# Patient Record
Sex: FEMALE | Race: WHITE
[De-identification: names, ages, dates, MRNs, and addresses within clinical notes are randomized per-mention and may not be internally consistent; named-entity substitution may affect disease eponyms.]

---

## 2018-02-13 ENCOUNTER — HOSPITAL ENCOUNTER (EMERGENCY)
Dept: HOSPITAL 39 - ER | Age: 58
Discharge: HOME | End: 2018-02-13
Payer: SELF-PAY

## 2018-02-13 VITALS — SYSTOLIC BLOOD PRESSURE: 119 MMHG | OXYGEN SATURATION: 94 % | DIASTOLIC BLOOD PRESSURE: 78 MMHG | TEMPERATURE: 98.5 F

## 2018-02-13 DIAGNOSIS — F17.200: ICD-10-CM

## 2018-02-13 DIAGNOSIS — J04.0: ICD-10-CM

## 2018-02-13 DIAGNOSIS — N30.90: Primary | ICD-10-CM

## 2018-02-13 PROCEDURE — 81001 URINALYSIS AUTO W/SCOPE: CPT

## 2018-02-13 PROCEDURE — 87086 URINE CULTURE/COLONY COUNT: CPT

## 2018-02-13 NOTE — ED.PDOC
History of Present Illness





- General


Chief Complaint: ENT Problem


Stated Complaint: cough, sorethroat, burning with urination


Time Seen by Provider: 02/13/18 15:55


Source: patient


Exam Limitations: no limitations





- History of Present Illness


Initial Comments: 





The patient is a 57-year-old  female presenting to the emergency room 

secondary to symptoms of cough congestion and laryngitis for the past for 5 

days.  Additionally she has been having urinary tract symptoms for the last 

week or 2.  She does have a history of frequent urinary tract infections in the 

past.  She has had some low-grade fevers at home.  No new back pain.  No nausea 

or vomiting. No uncontrolled headaches.  No altered mental status.  No 

shortness of breath.  She has had a cough.


Timing/Duration: 1 week


Severity: mild


Improving Factors: nothing


Worsening Factors: nothing


Associated Symptoms: denies symptoms


Allergies/Adverse Reactions: 


Allergies





Codeine Allergy (Verified 02/13/18 15:17)


 


Penicillins Allergy (Verified 02/13/18 15:17)


 


Tetanus Toxoids Allergy (Verified 02/13/18 15:17)


 








Home Medications: 


Ambulatory Orders





Cephalexin Monohydrate [Keflex] 500 mg PO Q8H #21 cap 02/13/18 


Hydroxyzine HCl 25 mg PO PRN 02/13/18 


busPIRone HCL [Buspar] 15 mg PO BID 02/13/18 











Review of Systems





- Review of Systems


Constitutional: States: malaise


EENTM: States: nose congestion, throat pain


Respiratory: States: cough


Cardiology: States: no symptoms reported


Gastrointestinal/Abdominal: States: no symptoms reported


Genitourinary: States: dysuria, frequency, pain


Musculoskeletal: States: no symptoms reported


Skin: States: no symptoms reported


Neurological: States: no symptoms reported


Endocrine: States: no symptoms reported


All other Systems: No Change from Baseline





Past Medical History (General)





- Patient Medical History


Hx of COPD: No


Hx Cardiac Disorders: No


Hx Congestive Heart Failure: No


Hx Hypertension: No


Hx Diabetes: No


Surgical History: Hysterectomy





- Vaccination History


Hx Tetanus, Diphtheria Vaccination: No - Allergic


Hx Influenza Vaccination: No


Hx Pneumococcal Vaccination: No





- Social History


Hx Tobacco Use: No


Hx Alcohol Use: No





- Female History


Patient is a Female of Child Bearing Age (10 -59 yrs old): No


Patient Pregnant: No





Family Medical History





- Family History


  ** Mother


Family History: Unknown


Living Status: Unknown





Physical Exam





- Physical Exam


General Appearance: Alert, Comfortable, No apparent distress


Eye Exam: bilateral normal


Ears, Nose, Throat: hearing grossly normal, nasal congestion, pharyngeal 

erythema


Neck: full range of motion, supple


Respiratory: lungs clear, normal breath sounds, no respiratory distress, no 

accessory muscle use


Cardiovascular/Chest: normal peripheral pulses, regular rate, rhythm, no edema


Peripheral Pulses: radial,right: 2+, radial,left: 2+, dorsalis pedis,right: 2+, 

dorsalis pedis,left: 2+


Gastrointestinal/Abdominal: non tender, soft


Rectal Exam: deferred


Back Exam: normal inspection, no CVA tenderness, no vertebral tenderness


Extremity: normal range of motion, non-tender, normal inspection, no pedal edema

, normal capillary refill


Neurologic: CNs II-XII nml as tested, alert, normal mood/affect, oriented x 3


Skin Exam: normal color


Comments: 





 Vital Signs - 24 hr











  02/13/18





  15:00


 


Temperature 98.5 F


 


Pulse Rate [ 107 H





pulse ox] 


 


Respiratory 20





Rate 


 


Blood Pressure 119/78





[Left Arm] 


 


O2 Sat by Pulse 94 L





Oximetry 














Progress





- Progress


Progress: 





02/13/18 16:50


the patient is a 57-year-old  female presenting with cystitis and 

upper respiratory tract infection including laryngitis.  The patient did 

receive a dose of oral prednisone here for the laryngitis.  She is going to be 

placed on a broad-spectrum antibiotic of Keflex for approximately 7 days.  She 

needs a test of cure on her urine in about 10 days.  She needs to return to the 

emergency room for any significant worsening.  She needs to stop smoking 

obviously.  ER warnings were given.  no flu swab was done due to lack of 

availability.  Minimal pulmonary symptoms were present so no empiric Tamiflu 

treatment was given.





- Results/Orders


Results/Orders: 





 





02/13/18 16:05


URINE CULTURE W/COLONY COUNT Stat 








 Laboratory Results - last 24 hr











  02/13/18





  16:05


 


Urine Color  Yellow


 


Urine Appearance  Cloudy


 


Urine pH  5.5


 


Ur Specific Gravity  1.020


 


Urine Protein  30


 


Urine Glucose (UA)  Negative


 


Urine Ketones  Negative


 


Urine Blood  Small H


 


Urine Nitrite  Positive H


 


Urine Bilirubin  Negative


 


Urine Urobilinogen  0.2


 


Ur Leukocyte Esterase  Large H


 


Urine RBC  Obscured by wbc's H


 


Urine WBC  Tntc H


 


Ur Epithelial Cells  Obscured by wbc's


 


Amorphous Sediment  4+


 


Urine Bacteria  4+ H














Departure





- Departure


Clinical Impression: 


 Cystitis





Upper respiratory tract infection


Qualifiers:


 URI type: acute laryngitis Qualified Code(s): J04.0 - Acute laryngitis





Disposition: Discharge to Home or Self Care


Condition: Fair


Departure Forms:  ED Discharge - Pt. Copy, Patient Portal Self Enrollment


Instructions:  DI for Acute Cystitis, DI for Laryngitis


Diet: regular diet


Activity: increase activity as tolerated


Referrals: 


MUMTAZ ONEIL [Primary Care Provider] - 1-2 Weeks


Prescriptions: 


Cephalexin Monohydrate [Keflex] 500 mg PO Q8H #21 cap


Home Medications: 


Ambulatory Orders





Cephalexin Monohydrate [Keflex] 500 mg PO Q8H #21 cap 02/13/18 


Hydroxyzine HCl 25 mg PO PRN 02/13/18 


busPIRone HCL [Buspar] 15 mg PO BID 02/13/18 








Additional Instructions: 


the patient is a 57-year-old  female presenting with cystitis and 

upper respiratory tract infection including laryngitis.  The patient did 

receive a dose of oral prednisone here for the laryngitis.  She is going to be 

placed on a broad-spectrum antibiotic of Keflex for approximately 7 days.  She 

needs a test of cure on her urine in about 10 days.  She needs to return to the 

emergency room for any significant worsening.  She needs to stop smoking 

obviously.  ER warnings were given.  no flu swab was done due to lack of 

availability.  Minimal pulmonary symptoms were present so no empiric Tamiflu 

treatment was given.

## 2020-03-17 ENCOUNTER — HOSPITAL ENCOUNTER (OUTPATIENT)
Age: 60
End: 2020-03-17
Payer: COMMERCIAL

## 2020-03-17 DIAGNOSIS — Z12.31: Primary | ICD-10-CM

## 2020-03-18 NOTE — MAM
EXAM DESCRIPTION: 

3D Screening BILATERAL : Digital Mammography.



CLINICAL HISTORY: 

60 years Female ANNUAL SCREENING . No complaints. No personal or

family history of breast cancer. Menarche age 17. Childbirth age

21. Menopause age 30. No HRT.. Lifetime risk of developing breast

cancer (Tyrer-Cuzick model)(%):  6.3.



COMPARISON: 

Baseline study at this facility..  No prior reports available.  



TECHNIQUE: 

Bilateral CC and MLO projection full-field images, digital

tomosynthesis mammographic technique. Bilateral digital 2-D

full-field MLO images. and CC images.  CAD available for 2-D

images.  



FINDINGS: 

The breast parenchymal density pattern is: Scattered areas of

fibroglandular density  No skin thickening or nipple retraction. 

Solitary microcalcifications.

No new focal, stellate mass or density, focal asymmetry , and no

suspicious microcalcifications bilaterally. 



IMPRESSION: 

Benign exam.



BIRAD CATEGORY: 2 BENIGN FINDINGS.



RECOMMENDATIONS:

FOLLOW UP: Routine digital bilateral  mammographic screening, one

year interval from  March 2020.



Written communication explaining the IMPRESSION and follow-up,

will be mailed to the patient and referring health care provider.

 



According to the [employer], yearly mammograms

are recommended starting at age 40 and continuing as long as a

woman is in good health.  Any breast change noted on a breast

self-exam should be reported promptly to the patient's healthcare

provider.  Breast MRI is recommended for women with an

approximately 20-25% or greater lifetime risk of breast cancer,

including women with a strong family history of breast or ovarian

cancer and women who have been treated for Hodgkin's disease.



A negative mammographic report should not delay tissue diagnosis

in patients with significant clinical history or physical

findings.



Extremely dense breast tissue limits the sensitivity of digital

mammography. 



Electronically signed by:  Mike Bentley MD  3/18/2020 6:29 PM CDT

Workstation: [id number]

## 2020-10-13 ENCOUNTER — HOSPITAL ENCOUNTER (EMERGENCY)
Dept: HOSPITAL 39 - ER | Age: 60
Discharge: HOME | End: 2020-10-13
Payer: SELF-PAY

## 2020-10-13 VITALS — TEMPERATURE: 97.1 F | DIASTOLIC BLOOD PRESSURE: 79 MMHG | OXYGEN SATURATION: 93 % | SYSTOLIC BLOOD PRESSURE: 110 MMHG

## 2020-10-13 DIAGNOSIS — Z88.7: ICD-10-CM

## 2020-10-13 DIAGNOSIS — R06.02: ICD-10-CM

## 2020-10-13 DIAGNOSIS — R51.9: ICD-10-CM

## 2020-10-13 DIAGNOSIS — R11.2: ICD-10-CM

## 2020-10-13 DIAGNOSIS — R19.7: ICD-10-CM

## 2020-10-13 DIAGNOSIS — Z88.5: ICD-10-CM

## 2020-10-13 DIAGNOSIS — Z88.0: ICD-10-CM

## 2020-10-13 DIAGNOSIS — Z20.828: ICD-10-CM

## 2020-10-13 DIAGNOSIS — N12: Primary | ICD-10-CM

## 2020-10-13 DIAGNOSIS — M79.10: ICD-10-CM

## 2020-10-13 DIAGNOSIS — Z79.899: ICD-10-CM

## 2020-10-13 DIAGNOSIS — E86.0: ICD-10-CM

## 2020-10-13 PROCEDURE — 71045 X-RAY EXAM CHEST 1 VIEW: CPT

## 2020-10-13 PROCEDURE — 83605 ASSAY OF LACTIC ACID: CPT

## 2020-10-13 PROCEDURE — 81001 URINALYSIS AUTO W/SCOPE: CPT

## 2020-10-13 PROCEDURE — 87086 URINE CULTURE/COLONY COUNT: CPT

## 2020-10-13 PROCEDURE — 85025 COMPLETE CBC W/AUTO DIFF WBC: CPT

## 2020-10-13 PROCEDURE — 87635 SARS-COV-2 COVID-19 AMP PRB: CPT

## 2020-10-13 PROCEDURE — 36415 COLL VENOUS BLD VENIPUNCTURE: CPT

## 2020-10-13 PROCEDURE — 80053 COMPREHEN METABOLIC PANEL: CPT

## 2020-10-13 NOTE — RAD
EXAM DESCRIPTION: 



Chest,1 View



CLINICAL HISTORY: 



SOB



COMPARISON: 



None.



IMPRESSION: 



Single AP portable upright view of the chest shows cardiac

silhouette and pulmonary vasculature to be within normal limits.

Lungs are normally aerated and clear.

No obvious pleural effusion or pneumothorax is seen.



Electronically signed by:  Dav Alarcon MD  10/13/2020 8:21 AM CDT

Workstation: [id number]

## 2020-10-13 NOTE — ED.PDOC
History of Present Illness





- General


Chief Complaint: Fever


Stated Complaint: Fever, SOB, body aches


Time Seen by Provider: 10/13/20 07:54


Source: patient, family


Exam Limitations: no limitations


Additional Information: 





Pt reports body aches, fever and mild SOB since last Thursday (6 days ago). Pt 

says mild HA, no CP, no sore throat. Pt reports mild N/V/D and mild abd 

cramping. She denies urinary symptoms. She is not aware of COVID exposure but 

her  says he had "a bug" right before she got sick and thinks she may 

have picked it from him.





- History of Present Illness


Comments: 





Past 6 days


Cough Quality/Degree: no cough


Allergies/Adverse Reactions: 


Allergies





Codeine Allergy (Verified 10/13/20 08:12)


   


Penicillins Allergy (Verified 10/13/20 08:12)


   


Tetanus Toxoids Allergy (Verified 10/13/20 08:12)


   





Home Medications: 


Ambulatory Orders





Cephalexin Monohydrate [Keflex] 500 mg PO Q8H #21 cap 02/13/18 


Hydroxyzine HCl 25 mg PO PRN 02/13/18 


busPIRone HCL [Buspar] 15 mg PO BID 02/13/18 


Cyclobenzaprine HCl [Flexeril] 5 mg PO TID PRN #30 tab 08/09/18 


predniSONE [Prednisone] 20 mg PO DAILY #7 tab 08/09/18 


Cephalexin Monohydrate [Keflex] 500 mg PO QID #10 cap 10/13/20 


Naproxen 250 mg PO BID #10 tab 10/13/20 


Promethazine Tab [Phenergan Tablet] 25 mg PO .Q4H #10 tab 10/13/20 











Review of Systems





- Review of Systems


Constitutional: States: chills, fever, weakness


EENTM: Denies: ear pain, ear discharge, nose pain, nose congestion, throat pain,

throat swelling


Respiratory: States: short of breath.  Denies: cough


Cardiology: Denies: chest pain, edema, palpitations, syncope


Gastrointestinal/Abdominal: States: abdominal pain - mild cramps, lower


Genitourinary: Denies: dysuria, frequency, hematuria, pain


Musculoskeletal: States: back pain, muscle pain


Skin: States: no symptoms reported


Neurological: States: headache - mild


Endocrine: States: no symptoms reported


Hematologic/Lymphatic: States: no symptoms reported





Past Medical History (General)





- Patient Medical History


Hx of COPD: No


Hx Cardiac Disorders: No


Hx Congestive Heart Failure: No


Hx Hypertension: No


Hx Diabetes: No





- Vaccination History


Hx Tetanus, Diphtheria Vaccination: No - Allergic


Hx Influenza Vaccination: No


Hx Pneumococcal Vaccination: No





- Social History


Hx Tobacco Use: No


Hx Alcohol Use: No





- Female History


Patient Pregnant: No





Family Medical History





- Family History


  ** Mother


Family History: Unknown


Living Status: Unknown





Physical Exam





- Physical Exam


General Appearance: Alert, No apparent distress, Obese, Well Groomed


ENT Exam: normal ENT inspection


Neck: full range of motion


Respiratory: chest non-tender, lungs clear, normal breath sounds, no respiratory

distress, no accessory muscle use, respiratory distress


Cardiovascular/Chest: no edema, no gallop, no JVD, no murmur, tachycardia


Gastrointestinal/Abdominal: normal bowel sounds, soft, no organomegaly, no 

pulsatile mass, tenderness - minimal suprapubic


Extremity: normal range of motion, non-tender, normal inspection, no pedal edema


Neurologic: alert, normal mood/affect, oriented x 3


Skin Exam: normal color, warm/dry





Progress





- Progress


Progress: 





10/13/20 09:26


                                        





10/13/20 08:50


URINE CULTURE W/COLONY COUNT Stat 





10/13/20 09:05


cefTRIAXone SODIUM [Rocephin] 1 gm   Sodium Chl 0.9% 50Ml Min-Bag+ [NS 50ml 

MINI-BAG+] 50 ml IVPB ONCE 








                               Laboratory Results











WBC  9.6 K/mm3 (4.8-10.8)   10/13/20  08:20    


 


RBC  4.11 M/mm3 (4.20-5.40)  L  10/13/20  08:20    


 


Hgb  12.6 gm/dL (12.0-16.0)   10/13/20  08:20    


 


Hct  36.5 % (36.0-47.0)   10/13/20  08:20    


 


MCV  88.8 fl (81.0-99.0)   10/13/20  08:20    


 


MCH  30.6 pg (27.0-31.0)   10/13/20  08:20    


 


MCHC  34.5 g/dL (33.0-37.0)   10/13/20  08:20    


 


RDW  14.8 % (11.5-14.5)  H  10/13/20  08:20    


 


Plt Count  367 K/mm3 (130-400)   10/13/20  08:20    


 


MPV  7.7 fl (7.40-10.4)   10/13/20  08:20    


 


Absolute Neuts (auto)  7.10 K/uL (1.8-6.8)  H  10/13/20  08:20    


 


Absolute Lymphs (auto)  1.40 K/uL (1.0-3.4)   10/13/20  08:20    


 


Absolute Monos (auto)  0.90 K/uL (0.2-0.8)  H  10/13/20  08:20    


 


Absolute Eos (auto)  0.00 K/uL (0.0-0.4)   10/13/20  08:20    


 


Absolute Basos (auto)  0.10 K/uL (0.0-0.1)   10/13/20  08:20    


 


Neutrophils %  74.6 % (42.0-78.0)   10/13/20  08:20    


 


Lymphocytes %  14.9 % (20.0-50.0)  L  10/13/20  08:20    


 


Monocytes %  9.3 % (2.0-9.0)  H  10/13/20  08:20    


 


Eosinophils %  0.5 % (1.0-5.0)  L  10/13/20  08:20    


 


Basophils %  0.7 % (0.0-2.0)   10/13/20  08:20    


 


Sodium  137 mmol/L (135-145)   10/13/20  08:20    


 


Potassium  4.0 mmol/L (3.6-5.0)   10/13/20  08:20    


 


Chloride  103 mmol/L (101-111)   10/13/20  08:20    


 


Carbon Dioxide  21 mmol/L (21-31)   10/13/20  08:20    


 


Anion Gap  17.0  (12-18)   10/13/20  08:20    


 


BUN  12 mg/dL (7-18)   10/13/20  08:20    


 


Creatinine  1.08 mg/dL (0.6-1.3)   10/13/20  08:20    


 


BUN/Creatinine Ratio  11.1  (10-20)   10/13/20  08:20    


 


Random Glucose  147 mg/dL ()  H  10/13/20  08:20    


 


Serum Osmolality  276.3 mOsm/L (275-295)   10/13/20  08:20    


 


Lactic Acid  1.0 mmol/L (0.5-2.2)   10/13/20  08:20    


 


Calcium  9.1 mg/dL (8.4-10.2)   10/13/20  08:20    


 


Total Bilirubin  0.8 mg/dL (0.2-1.0)   10/13/20  08:20    


 


AST  20 IU/L (10-42)   10/13/20  08:20    


 


ALT  27 IU/L (10-60)   10/13/20  08:20    


 


Alkaline Phosphatase  110 IU/L ()   10/13/20  08:20    


 


Serum Total Protein  9.4 gm/dL (6.4-8.2)  H  10/13/20  08:20    


 


Albumin  3.5 g/dl (3.2-5.5)   10/13/20  08:20    


 


Globulin  5.9 gm/dL (2.3-3.5)  H  10/13/20  08:20    


 


Albumin/Globulin Ratio  0.6  (1.1-1.9)  L  10/13/20  08:20    


 


Urine Color  Yellow  (Yellow)   10/13/20  08:50    


 


Urine Appearance  Sl cloudy  (Clear)   10/13/20  08:50    


 


Urine pH  6.0  (4.5-7.8)   10/13/20  08:50    


 


Ur Specific Gravity  1.010  (1.005-1.030)   10/13/20  08:50    


 


Urine Protein  30 mg/dL  10/13/20  08:50    


 


Urine Glucose (UA)  Negative mg/dL (Negative)   10/13/20  08:50    


 


Urine Ketones  Negative mg/dL (NEGATIVE)   10/13/20  08:50    


 


Urine Blood  Small  (Negative)  H  10/13/20  08:50    


 


Urine Nitrite  Positive  H  10/13/20  08:50    


 


Urine Bilirubin  Negative  (NEGATIVE)   10/13/20  08:50    


 


Urine Urobilinogen  0.2 mg/dL (0.2-1.0)   10/13/20  08:50    


 


Ur Leukocyte Esterase  Moderate  (Negative)  H  10/13/20  08:50    


 


Urine RBC  0-1 /hpf  10/13/20  08:50    


 


Urine WBC  >100 /hpf H  10/13/20  08:50    


 


Ur Epithelial Cells  0-1 /hpf  10/13/20  08:50    


 


Urine Bacteria  2+  H  10/13/20  08:50    








Rapid COVID negative





EXAM DESCRIPTION:  Chest,1 View  CLINICAL HISTORY:  SOB  COMPARISON:  None.  

IMPRESSION:  Single AP portable upright view of the chest shows cardiac 

silhouette and pulmonary vasculature to be within normal limits. Lungs are 

normally aerated and clear. No obvious pleural effusion or pneumothorax is seen.

 Electronically signed by: Dav Alarcon MD 10/13/2020 8:21 AM CDT Workstation: 

[id number]





10/13/20 09:39


Pt feels much better. No vomiting or diarrhea in ER. Pulse now , SBP nl. 

Pt does not feel like she needs any more IVF's. Pt requests a little more for 

aching and nausea. Will order Toradol and Zofran. Pt has no concerns about being

d/c home. 





Departure





- Departure


Clinical Impression: 


 Pyelonephritis, Dehydration, Myalgia, Nausea vomiting and diarrhea





Disposition: Discharge to Home or Self Care


Departure Forms:  ED Discharge - Pt. Copy, Patient Portal Self Enrollment


Diet: full liquid diet


Activity: increase activity as tolerated


Referrals: 


MUMTAZ ONEIL [Primary Care Provider] - 1-2 Weeks


Prescriptions: 


Cephalexin Monohydrate [Keflex] 500 mg PO QID #10 cap


Naproxen 250 mg PO BID #10 tab


Promethazine Tab [Phenergan Tablet] 25 mg PO .Q4H #10 tab


Home Medications: 


Ambulatory Orders





Cephalexin Monohydrate [Keflex] 500 mg PO Q8H #21 cap 02/13/18 


Hydroxyzine HCl 25 mg PO PRN 02/13/18 


busPIRone HCL [Buspar] 15 mg PO BID 02/13/18 


Cyclobenzaprine HCl [Flexeril] 5 mg PO TID PRN #30 tab 08/09/18 


predniSONE [Prednisone] 20 mg PO DAILY #7 tab 08/09/18 


Cephalexin Monohydrate [Keflex] 500 mg PO QID #10 cap 10/13/20 


Naproxen 250 mg PO BID #10 tab 10/13/20 


Promethazine Tab [Phenergan Tablet] 25 mg PO .Q4H #10 tab 10/13/20